# Patient Record
Sex: FEMALE
[De-identification: names, ages, dates, MRNs, and addresses within clinical notes are randomized per-mention and may not be internally consistent; named-entity substitution may affect disease eponyms.]

---

## 2022-05-19 ENCOUNTER — HOSPITAL ENCOUNTER (OUTPATIENT)
Dept: HOSPITAL 5 - XRAY | Age: 62
Discharge: HOME | End: 2022-05-19
Attending: INTERNAL MEDICINE
Payer: COMMERCIAL

## 2022-05-19 DIAGNOSIS — M47.817: Primary | ICD-10-CM

## 2022-05-19 DIAGNOSIS — M17.0: ICD-10-CM

## 2022-05-19 PROCEDURE — 72100 X-RAY EXAM L-S SPINE 2/3 VWS: CPT

## 2022-05-19 NOTE — XRAY REPORT
Bilateral knee



INDICATION: Knee pain



IMPRESSION: Tricompartmental degenerative change with joint space narrowing in the medial compartment
 and patellofemoral joints. No acute fractures seen. No joint effusion.



Signer Name: Flakito Up MD 

Signed: 5/19/2022 2:02 PM

Workstation Name: [id number]

## 2022-05-19 NOTE — XRAY REPORT
LUMBOSACRAL SPINE 3 VIEWS



INDICATION:  BACK PAIN.



COMPARISON: None.



IMPRESSION: Mild osteopenia is suspected. Normal alignment.  Mild discogenic DJD and facet arthropath
y are identified at all levels. The SI joints are unremarkable.  No acute osseous or soft tissue abno
rmality.    



Signer Name: lEiseo Hebert Jr, MD 

Signed: 5/19/2022 2:29 PM

Workstation Name: [id number]